# Patient Record
Sex: MALE | Race: BLACK OR AFRICAN AMERICAN | NOT HISPANIC OR LATINO
[De-identification: names, ages, dates, MRNs, and addresses within clinical notes are randomized per-mention and may not be internally consistent; named-entity substitution may affect disease eponyms.]

---

## 2018-12-21 ENCOUNTER — LABORATORY RESULT (OUTPATIENT)
Age: 28
End: 2018-12-21

## 2018-12-21 ENCOUNTER — APPOINTMENT (OUTPATIENT)
Dept: ORTHOPEDIC SURGERY | Facility: CLINIC | Age: 28
End: 2018-12-21
Payer: COMMERCIAL

## 2018-12-21 VITALS — BODY MASS INDEX: 32.43 KG/M2 | WEIGHT: 214 LBS | HEIGHT: 68 IN

## 2018-12-21 DIAGNOSIS — M25.562 PAIN IN LEFT KNEE: ICD-10-CM

## 2018-12-21 PROCEDURE — 73564 X-RAY EXAM KNEE 4 OR MORE: CPT | Mod: LT

## 2018-12-21 PROCEDURE — 99203 OFFICE O/P NEW LOW 30 MIN: CPT | Mod: 25

## 2018-12-21 PROCEDURE — 20610 DRAIN/INJ JOINT/BURSA W/O US: CPT | Mod: LT

## 2018-12-27 ENCOUNTER — RESULT REVIEW (OUTPATIENT)
Age: 28
End: 2018-12-27

## 2019-02-28 ENCOUNTER — APPOINTMENT (OUTPATIENT)
Dept: ORTHOPEDIC SURGERY | Facility: CLINIC | Age: 29
End: 2019-02-28

## 2019-06-20 ENCOUNTER — EMERGENCY (EMERGENCY)
Facility: HOSPITAL | Age: 29
LOS: 1 days | Discharge: HOME | End: 2019-06-20
Admitting: EMERGENCY MEDICINE
Payer: COMMERCIAL

## 2019-06-20 VITALS
TEMPERATURE: 99 F | DIASTOLIC BLOOD PRESSURE: 75 MMHG | HEART RATE: 90 BPM | RESPIRATION RATE: 16 BRPM | SYSTOLIC BLOOD PRESSURE: 123 MMHG | OXYGEN SATURATION: 100 %

## 2019-06-20 PROCEDURE — 99053 MED SERV 10PM-8AM 24 HR FAC: CPT

## 2019-06-20 PROCEDURE — 99282 EMERGENCY DEPT VISIT SF MDM: CPT | Mod: 25

## 2019-06-20 RX ORDER — LIDOCAINE 4 G/100G
1 CREAM TOPICAL ONCE
Refills: 0 | Status: COMPLETED | OUTPATIENT
Start: 2019-06-20 | End: 2019-06-20

## 2019-06-20 RX ORDER — CYCLOBENZAPRINE HYDROCHLORIDE 10 MG/1
1 TABLET, FILM COATED ORAL
Qty: 10 | Refills: 0
Start: 2019-06-20 | End: 2019-06-29

## 2019-06-20 RX ADMIN — LIDOCAINE 1 PATCH: 4 CREAM TOPICAL at 05:06

## 2019-06-20 NOTE — ED PROVIDER NOTE - NSFOLLOWUPINSTRUCTIONS_ED_ALL_ED_FT
Follow up with your primary doctor, take naproxen every 12 hours as needed and take flexeril which is a muscle relaxer once per day at bedtime.  Do not drive after this medication because it can make you drowsey. Advance activity as tolerated.  Continue all previously prescribed medications as directed.  Follow up with your primary care physician in 48-72 hours- bring copies of your results.  Return to the ER for worsening or persistent symptoms, and/or ANY NEW OR CONCERNING SYMPTOMS. If you have issues obtaining follow up, please call: [phone] to obtain a doctor or specialist who takes your insurance in your area.  You may call [phone] to make an appointment with the internal medicine clinic.
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
## 2021-12-17 NOTE — ED ADULT TRIAGE NOTE - AS TEMP SITE
Procedure Date:  2021    Patient:  Gino Casillas  :  1978    Sedation:  MAC    Outpatient History and Physical Review for Colonoscopy     Indications:  Personal Hx of Colonic Polyps    Family History of Colon Cancer or Colon Polyps:  Yes-polyps    Current Outpatient Medications   Medication Sig Dispense Refill   • finasteride (PROPECIA) 1 MG tablet Take 1 tablet by mouth daily. 90 tablet 1   • Multiple Vitamin tablet Take 1 tablet by mouth daily.     • LORazepam (ATIVAN) 1 MG tablet Take 1 tablet by mouth every 8 hours as needed for Anxiety. 30 tablet 0   • imiquimod (ALDARA) 5 % cream APPLY TO THE AFFECTED AREA OF GENITAL SKIN 5 TIMES A  WEEK AT BEDTIME 48 each 3   • tretinoin (RETIN-A) 0.1 % cream apply topically sparingly to the affected skin nightly as directed 45 g 0   • mupirocin (BACTROBAN) 2 % ointment Apply 3 times daily to corners of mouth until clear (use at least 10 days) 30 g 1     Current Facility-Administered Medications   Medication Dose Route Frequency Provider Last Rate Last Admin   • lidocaine-sodium bicarbonate 0.9-8.4% for J-tip administration 0.5-1 mL  0.5-1 mL Subcutaneous PRN Rey Yee MD        Or   • lidocaine 1 % injection 5-10 mg  5-10 mg Subcutaneous PRN Rey Yee MD       • sodium chloride 0.9 % flush bag 25 mL  25 mL Intravenous PRN Rey Yee MD       • sodium chloride (PF) 0.9 % injection 2 mL  2 mL Intracatheter 2 times per day Rey Yee MD       • sodium chloride 0.9% infusion   Intravenous Continuous Rey Yee MD       • sodium chloride 0.9% infusion   Intravenous Continuous Chuck Infante MD       • lidocaine-sodium bicarbonate 0.9-8.4% for J-tip administration 0.5-1 mL  0.5-1 mL Subcutaneous Once Chuck Infante MD           ALLERGIES:  Sulfa antibiotics            Past Medical History:   Diagnosis Date   • Colon Polyps 2017    Sessile serrated adenoma 3yr fu Dr Infante    • PMH of     none     Past Surgical History:   Procedure  Laterality Date   • Colonoscopy w biopsy  12/22/2017    Sessile Serrated Adenoma - next exam due in 3 years- Dr Infante   • Vasectomy  2012   • Vasectomy reversal  2015     Social History     Tobacco Use   • Smoking status: Never Smoker   • Smokeless tobacco: Never Used   Vaping Use   • Vaping Use: never used   Substance Use Topics   • Alcohol use: Yes     Comment: 4 drinks/month   • Drug use: No       PHYSICAL EXAM:  HEENT:  Within normal limits  LUNGS:  Lungs clear to auscultation.  No cold symptoms present.  HEART:  S1, S2, regular rate and rhythm, no murmur.  NEUROLOGICAL:  Within normal limits.  MENTAL STATUS:  Alert, oriented x 3, interactive.  SKIN:  Warm, dry and intact, no lesions or rashes.   GENITOURINARY:  N\A    The risks of the procedure including the possibility of bleeding, perforation (possibly resulting in laparotomy/colostomy) aspiration, and the risk of a polypectomy were discussed with the patient who accepts these risks.               oral
